# Patient Record
(demographics unavailable — no encounter records)

---

## 2024-10-15 NOTE — ADDENDUM
[FreeTextEntry1] : I, IZA STACK, acted solely as a scribe for Dr. Francis Triplett on this date 10/15/2024.  All medical record entries made by the Scribe were at my, Dr. Francis Triplett, direction and personally dictated by me on 10/15/2024. I have reviewed the chart and agree that the record accurately reflects my personal performance of the history, physical exam, assessment and plan. I have also personally directed, reviewed, and agreed with the chart.

## 2024-10-15 NOTE — HISTORY OF PRESENT ILLNESS
[de-identified] : 85-year-old male presents for a follow-up of right elbow and right wrist pan. He notes he is going to physical therapy for his right elbow.  He had a right elbow cortisone injection on 8/29/2024 and notes dramatic relief. He notes his right elbow is progressing in physical therapy. He is complaining of left thigh pain for months. He has back pain all the time. The patient cannot attribute his pain to any injury, fall, or trauma. He states the pain is a "dull ache". He does not have difficulty bringing his leg up. Patient states it does not limit his walking. Patient denies that he has any numbness or tingling.   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

## 2024-10-15 NOTE — DISCUSSION/SUMMARY
[de-identified] : I went over the pathophysiology of the patient's symptoms in great detail with the patient. I discussed the underlying pathophysiology of the patient's condition in great detail with the patient. I went over the patient's x-rays with them in great detail. No surgical intervention is recommended at this time. At-home strengthening exercises were discussed and demonstrated in great detail with the patient, with an exercise sheet being provided. He should purchase adjustable ankle weights and begin a diligent at-home exercise program. He should focus on light weight and high repetition exercises. We discussed the use of ice, Tylenol and anti-inflammatories to relieve pain. I warned him to be very careful because a fall could result in serious consequences.   All of their questions were answered. They understand and consent to the plan.   FU in 4-6 weeks.

## 2024-10-15 NOTE — PHYSICAL EXAM
[de-identified] : o Right Elbow :  Inspection/Palpation : no lateral epicondyle tenderness, no medial or soft spot, no electron or triceps, mild swelling or deformities  Range of Motion : 0-135 full and painless in all planes, no crepitance  Strength : flexion and extension 5/5  Stability : no joint instability on provocative testing  o Sensation : sensation intact to light touch o Tests: Tinels Sign negative, no pain with resisted pronation or supination,  ni pain with resisted extension of the middle finger, no pain with resisted extension of the wrist, pain with gripping   o right Wrist:  Inspection/Palpation : no tenderness, no swelling or deformities  Range of Motion : full and painless in all planes, no crepitance  Strength : extension, flexion, ulnar deviation and radial deviation 5/5  Stability : no joint instability on provocative testing  Tests/Signs : Tinel's sign negative over carpal tunnel, negative Phalens, negative Finkelsteins test, negative grind test   Lumbosacral Spine o Inspection : no visible rash or discoloration o Palpation : no sciatic notch or SIJ tenderness  o Range of Motion : extension does not cause discomfort, side bending right and left does,  rotation does no cause discomfort  o Muscle Strength : paraspinal muscle strength and tone within normal limits o Muscle Tone : paraspinal muscle strength and tone within normal limits Tests: straight leg test negative   Right Lower Extremity o Buttock : no tenderness, swelling or deformities o Right Hip :  Inspection/Palpation : no tenderness, swelling or deformities  Range of Motion : full and painless in all planes, no crepitance  Stability : joint stability intact  Strength : extension, flexion, adduction, abduction, internal rotation and external rotation 5/5   Left Lower Extremity o Buttock : no tenderness, swelling or deformities  o Left Hip :  Inspection/Palpation : no tenderness, swelling or deformities  Range of Motion : full and discomfort with rotation, no crepitance  Stability : joint stability intact  Strength : extension, flexion, adduction, abduction, internal rotation and external rotation 4-/5   Gait: gait normal, no foot drop, ankle strength 5/5, normal sensation to light touch, tight hamstrings B/L, limited core strength  no significant extremity swelling or lymphedema, positive john test on the left, limited core strength, very tight hamstrings   Radiology Results 10/15/2024 o Lumbosacral Spine: AP and lateral views were obtained and revealed diffuse facet arthropathy, with degenerative disc disease worse at T12 and-L2-3, mild foraminal stenosis at L5-S1 o Pelvis: AP pelvis was obtained and revealed moderate arthritis of both hips left > right sacroiliac joint

## 2024-11-19 NOTE — PHYSICAL EXAM
[de-identified] : o Right Elbow :  Inspection/Palpation : no lateral epicondyle tenderness, no medial or soft spot, no electron or triceps, mild swelling or deformities  Range of Motion : 0-135 full and painless in all planes, no crepitance  Strength : flexion and extension 5/5  Stability : no joint instability on provocative testing  o Sensation : sensation intact to light touch o Tests: Tinels Sign negative, no pain with resisted pronation or supination,  ni pain with resisted extension of the middle finger, no pain with resisted extension of the wrist, pain with gripping   o right Wrist:  Inspection/Palpation : no tenderness, no swelling or deformities  Range of Motion : full and painless in all planes, no crepitance  Strength : extension, flexion, ulnar deviation and radial deviation 5/5  Stability : no joint instability on provocative testing  Tests/Signs : Tinel's sign negative over carpal tunnel, negative Phalens, negative Finkelsteins test, negative grind test   Lumbosacral Spine o Inspection : no visible rash or discoloration o Palpation : no sciatic notch or SIJ tenderness  o Range of Motion : extension does not cause discomfort, side bending right and left does,  rotation does no cause discomfort  o Muscle Strength : paraspinal muscle strength and tone within normal limits o Muscle Tone : paraspinal muscle strength and tone within normal limits Tests: straight leg test negative   Right Lower Extremity o Buttock : no tenderness, swelling or deformities o Right Hip :  Inspection/Palpation : no tenderness, swelling or deformities  Range of Motion : full and painless in all planes, no crepitance  Stability : joint stability intact  Strength : extension, flexion, adduction, abduction, internal rotation and external rotation 5/5   Left Lower Extremity o Buttock : no tenderness, swelling or deformities  o Left Hip :  Inspection/Palpation : no tenderness, swelling or deformities  Range of Motion : full and limited ER rotation, no crepitance  Stability : joint stability intact  Strength : extension, flexion, adduction, abduction, internal rotation and external rotation 4-/5   Gait: gait normal, no foot drop, ankle strength 5/5, normal sensation to light touch, tight hamstrings B/L, limited core strength  no significant extremity swelling or lymphedema, positive john test on the left, limited core strength, very tight hamstrings

## 2024-11-19 NOTE — HISTORY OF PRESENT ILLNESS
[de-identified] : 86 year old male presents for a follow-up evaluation of left thigh pain. He states the left leg pain has improved. He denies groin and thigh pain. He denies problems on the right side. Patient denies that he has any numbness or tingling. He does have constant lower back pain. He is not in physical therapy for the left leg at this point. He had a right elbow cortisone injection on 8/29/2024 and notes dramatic relief.  Radiology Results 10/15/2024 o Lumbosacral Spine: AP and lateral views were obtained and revealed diffuse facet arthropathy, with degenerative disc disease worse at T12 and-L2-3, mild foraminal stenosis at L5-S1 o Pelvis: AP pelvis was obtained and revealed moderate arthritis of both hips left > right sacroiliac joint  Radiology Results 6/11/2024 Lumbosacral Spine : AP and lateral views were obtained and reveal diffuse facet arthropathy, degenerative disc disease at L2-3 and T12 and L1 Pelvis: AP pelvis was obtained and revealed moderate arthritis of both hips  Right Elbow: AP, lateral, and oblique views were obtained and revealed mild degenerative arthritis of the right elbow

## 2024-11-19 NOTE — ADDENDUM
[FreeTextEntry1] : I, IZA STACK, acted solely as a scribe for Dr. Francis Triplett on this date 11/19/2024.  All medical record entries made by the Scribe were at my, Dr. Francis Triplett, direction and personally dictated by me on 11/19/2024. I have reviewed the chart and agree that the record accurately reflects my personal performance of the history, physical exam, assessment and plan. I have also personally directed, reviewed, and agreed with the chart.

## 2024-11-19 NOTE — DISCUSSION/SUMMARY
[de-identified] : I went over the pathophysiology of the patient's symptoms in great detail with the patient. I discussed the underlying pathophysiology of the patient's condition in great detail with the patient. I went over the patient's x-rays with them in great detail. At this time, he will start a course of physical therapy for strengthening and flexibility. A prescription was provided. We discussed the use of ice, Tylenol and anti-inflammatories to relieve pain.   All of their questions were answered. They understand and consent to the plan.   FU in 4-6 weeks.

## 2025-01-14 NOTE — DISCUSSION/SUMMARY
[de-identified] : I went over the pathophysiology of the patient's symptoms in great detail with the patient. I discussed the underlying pathophysiology of the patient's condition in great detail with the patient. I went over the patient's x-rays with them in great detail. We discussed the use of ice, Tylenol and anti-inflammatories to relieve pain. At this time, he will start a course of physical therapy for strengthening and flexibility. A prescription was provided. We discussed the use of ice, Tylenol and anti-inflammatories to relieve pain. At-home strengthening exercises were discussed and demonstrated with the patient. When he is driving, I demonstrated to him how to their elbow with their hand on the wheel while they are stopped at a red light. I instructed him on picking up items with hands in the supinated position and not in the pronated position as to avoid aggravating his elbow.  We gave him a doughnut with a compression sleeve to wear at night as a trial as we feel lying on this may aggravate the elbow.  All of their questions were answered. They understand and consent to the plan.   FU in 4-6 weeks.

## 2025-01-14 NOTE — HISTORY OF PRESENT ILLNESS
[de-identified] : 86 year old male presents complaining of right wrist pain. He participated in physical therapy for his right elbow pain which helped. He stopped physical therapy in November.  He had a right elbow cortisone injection on 8/29/2024 and notes dramatic relief. He is now complaining of right wrist discomfort. The patient cannot attribute his pain to any injury, fall, or trauma. Patient denies that he has any numbness or tingling. He described the pain as "twinges". He denies any recent CSI injections. He denies using ice or any topical treatments.   Radiology Results 10/15/2024 o Lumbosacral Spine: AP and lateral views were obtained and revealed diffuse facet arthropathy, with degenerative disc disease worse at T12 and-L2-3, mild foraminal stenosis at L5-S1 o Pelvis: AP pelvis was obtained and revealed moderate arthritis of both hips left > right sacroiliac joint  Radiology Results 6/11/2024 Lumbosacral Spine : AP and lateral views were obtained and reveal diffuse facet arthropathy, degenerative disc disease at L2-3 and T12 and L1 Pelvis: AP pelvis was obtained and revealed moderate arthritis of both hips  Right Elbow: AP, lateral, and oblique views were obtained and revealed mild degenerative arthritis of the right elbow

## 2025-01-14 NOTE — PHYSICAL EXAM
[de-identified] : o Right Elbow :  Inspection/Palpation : no medial epicondyle tenderness,  lateral epicondyle tenderness, no medial or soft spot, no electron or triceps, mild swelling or deformities  Range of Motion : 0-135 full and painless in all planes,  causes forearm discomfort, pain with limited Dorsi flexion but symmetrical to the other side, no crepitance resisted pronation and supination do not cause discomfort, minimal pain with extension and gripping causes discomfort   Strength : flexion and extension 5/5  Stability : no joint instability on provocative testing  o Sensation : sensation intact to light touch o Tests: Tinels Sign negative, no pain with resisted pronation or supination,  ni pain with resisted extension of the middle finger, no pain with resisted extension of the wrist, pain with gripping   o right Wrist:  Inspection/Palpation : no tenderness, no swelling or deformities  Range of Motion : full and painless in all planes, no crepitance  Strength : extension, flexion, ulnar deviation and radial deviation 5/5  Stability : no joint instability on provocative testing  Tests/Signs : Tinel's sign negative over carpal tunnel, negative Phalens, negative Finkelsteins test, negative grind test   Lumbosacral Spine o Inspection : no visible rash or discoloration o Palpation : no sciatic notch or SIJ tenderness  o Range of Motion : extension does not cause discomfort, side bending right and left does,  rotation does no cause discomfort  o Muscle Strength : paraspinal muscle strength and tone within normal limits o Muscle Tone : paraspinal muscle strength and tone within normal limits Tests: straight leg test negative   Right Lower Extremity o Buttock : no tenderness, swelling or deformities o Right Hip :  Inspection/Palpation : no tenderness, swelling or deformities  Range of Motion : full and painless in all planes, no crepitance  Stability : joint stability intact  Strength : extension, flexion, adduction, abduction, internal rotation and external rotation 5/5   Left Lower Extremity o Buttock : no tenderness, swelling or deformities  o Left Hip :  Inspection/Palpation : no tenderness, swelling or deformities  Range of Motion : full and limited ER rotation, no crepitance  Stability : joint stability intact  Strength : extension, flexion, adduction, abduction, internal rotation and external rotation 4-/5   Gait: gait normal, no foot drop, ankle strength 5/5, normal sensation to light touch, tight hamstrings B/L, limited core strength  no significant extremity swelling or lymphedema, positive john test on the left, limited core strength, very tight hamstrings    Radiology Results 1/14/2025 o Right Wrist: AP, lateral, and oblique were obtained and revealed moderate degenerative arthritis and distal radial ulnar joint and minimal intercarpal arthritis 
WDL

## 2025-01-14 NOTE — ADDENDUM
[FreeTextEntry1] : I, IZA STACK, acted solely as a scribe for Dr. Francis Triplett on this date 01/14/2025.  All medical record entries made by the Scribe were at my, Dr. Francis Triplett, direction and personally dictated by me on 01/14/2025. I have reviewed the chart and agree that the record accurately reflects my personal performance of the history, physical exam, assessment and plan. I have also personally directed, reviewed, and agreed with the chart.

## 2025-02-27 NOTE — ADDENDUM
[FreeTextEntry1] : I, IZA STACK, acted solely as a scribe for Dr. Francis Triplett on this date 02/27/2025.  All medical record entries made by the Scribe were at my, Dr. Francis Triplett, direction and personally dictated by me on 02/27/2025. I have reviewed the chart and agree that the record accurately reflects my personal performance of the history, physical exam, assessment and plan. I have also personally directed, reviewed, and agreed with the chart.								 .

## 2025-02-27 NOTE — HISTORY OF PRESENT ILLNESS
[de-identified] : 86 year old RHD male presents complaining of right wrist and elbow pain. He is participating in physical therapy for his right elbow pain and reports it helping but he continues to have discomfort. He has difficulty gripping.  He notes certain movements cause strains of his right forearm. He notes tenderness to the touch on the lateral aspect of the elbow. He had a right elbow cortisone injection on 8/29/2024 and notes dramatic relief. Patient denies that he has any numbness or tingling. He described the pain as "twinges". He denies any recent CSI injections He does think the right elbow pain is bad enough for a cortisone injection. His wife states he uses Voltaren gel as a topical treatment.   Radiology Results 1/14/2025 o Right Wrist: AP, lateral, and oblique were obtained and revealed moderate degenerative arthritis and distal radial ulnar joint and minimal intercarpal arthritis   Radiology Results 10/15/2024 o Lumbosacral Spine: AP and lateral views were obtained and revealed diffuse facet arthropathy, with degenerative disc disease worse at T12 and-L2-3, mild foraminal stenosis at L5-S1 o Pelvis: AP pelvis was obtained and revealed moderate arthritis of both hips left > right sacroiliac joint  Radiology Results 6/11/2024 Lumbosacral Spine : AP and lateral views were obtained and reveal diffuse facet arthropathy, degenerative disc disease at L2-3 and T12 and L1 Pelvis: AP pelvis was obtained and revealed moderate arthritis of both hips  Right Elbow: AP, lateral, and oblique views were obtained and revealed mild degenerative arthritis of the right elbow

## 2025-02-27 NOTE — PHYSICAL EXAM
[de-identified] : o Right Elbow :  Inspection/Palpation : no medial epicondyle tenderness,  lateral epicondyle tenderness, no medial or soft spot, no electron or triceps, mild swelling or deformities, no pain in the lateral epicondyle with gripping   Range of Motion : 0-135 full and painless in all planes,  causes strain forearm discomfort, pain with limited Dorsi flexion but symmetrical to the other side, no crepitance resisted pronation and supination do not cause discomfort, minimal pain with extension and gripping causes discomfort, strain with resisted supination, lacks a few degrees of extension  Strength : flexion and extension 5/5  Stability : no joint instability on provocative testing  o Sensation : sensation intact to light touch o Tests: Tinels Sign negative, no pain with resisted pronation or supination,  ni pain with resisted extension of the middle finger, no pain with resisted extension of the wrist, pain with gripping   o right Wrist:  Inspection/Palpation : no tenderness, no swelling or deformities  Range of Motion : full and painless in all planes, no crepitance  Strength : extension, flexion, ulnar deviation and radial deviation 5/5  Stability : no joint instability on provocative testing  Tests/Signs : Tinel's sign negative over carpal tunnel, negative Phalens, negative Finkelsteins test, negative grind test   Lumbosacral Spine o Inspection : no visible rash or discoloration o Palpation : no sciatic notch or SIJ tenderness  o Range of Motion : extension does not cause discomfort, side bending right and left does,  rotation does no cause discomfort  o Muscle Strength : paraspinal muscle strength and tone within normal limits o Muscle Tone : paraspinal muscle strength and tone within normal limits Tests: straight leg test negative   Right Lower Extremity o Buttock : no tenderness, swelling or deformities o Right Hip :  Inspection/Palpation : no tenderness, swelling or deformities  Range of Motion : full and painless in all planes, no crepitance  Stability : joint stability intact  Strength : extension, flexion, adduction, abduction, internal rotation and external rotation 5/5   Left Lower Extremity o Buttock : no tenderness, swelling or deformities  o Left Hip :  Inspection/Palpation : no tenderness, swelling or deformities  Range of Motion : full and limited ER rotation, no crepitance  Stability : joint stability intact  Strength : extension, flexion, adduction, abduction, internal rotation and external rotation 4-/5   Gait: gait normal, no foot drop, ankle strength 5/5, normal sensation to light touch, tight hamstrings B/L, limited core strength  no significant extremity swelling or lymphedema, positive john test on the left, limited core strength, very tight hamstrings    o Elbow : Indication- right lateral epicondylitis, Anatomic location- right area of the lateral epicondyle, Spray - area was sterilized with Betadine and alcohol and anesthetized with Ethyl Chloride , needle used-25G, Medications given- 1cc's lidocaine, 0.5cc's kenalog, 0.5cc's dexamethasone. The patient tolerated the procedure well.

## 2025-02-27 NOTE — DISCUSSION/SUMMARY
[de-identified] : I went over the pathophysiology of the patient's symptoms in great detail with the patient. I am recommending the patient continues to go to physical therapy to obtain increases in their strength and mobility. A prescription was provided. The patient elected to receive a cortisone injection into his right elbow today, and tolerated it well. I instructed the patient on ROM exercises, and told them to take it easy. The use of ice and rest was reviewed with the patient. The patient may resume activities tomorrow. We discussed the use of ice, Tylenol and anti-inflammatories to relieve pain.  At-home strengthening exercises were discussed and demonstrated with the patient. We discussed the use of Voltaren gel at this time. Instructions were discussed with the patient.   All of their questions were answered. They understand and consent to the plan.   FU in 4-6 weeks.

## 2025-03-31 NOTE — DISCUSSION/SUMMARY
[de-identified] : I went over the pathophysiology of the patient's symptoms in great detail with the patient. I am recommending the patient continues to go to physical therapy to obtain increases in their strength and mobility. A prescription was provided. We discussed the use of ice, Tylenol and anti-inflammatories to relieve pain.  We discussed the use of Voltaren gel at this time. Instructions were discussed with the patient. We discussed the use of ice, Tylenol and anti-inflammatories to relieve pain. At-home strengthening exercises were discussed and demonstrated with the patient. When he is driving, I demonstrated to him how to their elbow with their hand on the wheel while they are stopped at a red light. I instructed him on picking up items with hands in the supinated position and not in the pronated position as to avoid aggravating his elbow.   All of their questions were answered. They understand and consent to the plan.   FU in 4-6 weeks.

## 2025-03-31 NOTE — PHYSICAL EXAM
[de-identified] : o Right Elbow :  Inspection/Palpation : no medial epicondyle tenderness,  lateral epicondyle tenderness, no medial or soft spot, no electron or triceps, mild swelling or deformities, pain in the lateral epicondyle with gripping,   Range of Motion : 0-135 full and painless in all planes,  causes strain forearm discomfort, pain with limited Dorsi flexion but symmetrical to the other side, no crepitance resisted pronation and supination do not cause discomfort, minimal pain with extension and gripping causes discomfort, strain with resisted supination, lacks a few degrees of extension  Strength : flexion and extension 5/5  Stability : no joint instability on provocative testing  o Sensation : sensation intact to light touch o Tests: Tinels Sign negative, no pain with resisted pronation or supination,  no pain with resisted extension of the middle finger, no pain with resisted extension of the wrist, pain with gripping,   o right Wrist:  Inspection/Palpation : no tenderness, no swelling or deformities  Range of Motion : full and painless in all planes, no crepitance  Strength : extension, flexion, ulnar deviation and radial deviation 5/5  Stability : no joint instability on provocative testing  Tests/Signs : Tinel's sign negative over carpal tunnel, negative Phalens, negative Finkelsteins test, negative grind test   Lumbosacral Spine o Inspection : no visible rash or discoloration o Palpation : no sciatic notch or SIJ tenderness  o Range of Motion : extension does not cause discomfort, side bending right and left does,  rotation does no cause discomfort  o Muscle Strength : paraspinal muscle strength and tone within normal limits o Muscle Tone : paraspinal muscle strength and tone within normal limits Tests: straight leg test negative   Right Lower Extremity o Buttock : no tenderness, swelling or deformities o Right Hip :  Inspection/Palpation : no tenderness, swelling or deformities  Range of Motion : full and painless in all planes, no crepitance  Stability : joint stability intact  Strength : extension, flexion, adduction, abduction, internal rotation and external rotation 5/5   Left Lower Extremity o Buttock : no tenderness, swelling or deformities  o Left Hip :  Inspection/Palpation : no tenderness, swelling or deformities  Range of Motion : full and limited ER rotation, no crepitance  Stability : joint stability intact  Strength : extension, flexion, adduction, abduction, internal rotation and external rotation 4-/5   Gait: gait normal, no foot drop, ankle strength 5/5, normal sensation to light touch, tight hamstrings B/L, limited core strength  no significant extremity swelling or lymphedema, positive john test on the left, limited core strength, very tight hamstrings

## 2025-03-31 NOTE — ADDENDUM
[FreeTextEntry1] : I, IZA STACK, acted solely as a scribe for Dr. Francis Triplett on this date 03/31/2025.  All medical record entries made by the Scribe were at my, Dr. Francis Triplett, direction and personally dictated by me on 03/31/2025. I have reviewed the chart and agree that the record accurately reflects my personal performance of the history, physical exam, assessment and plan. I have also personally directed, reviewed, and agreed with the chart.